# Patient Record
Sex: FEMALE | Race: BLACK OR AFRICAN AMERICAN
[De-identification: names, ages, dates, MRNs, and addresses within clinical notes are randomized per-mention and may not be internally consistent; named-entity substitution may affect disease eponyms.]

---

## 2017-07-25 ENCOUNTER — HOSPITAL ENCOUNTER (EMERGENCY)
Dept: HOSPITAL 74 - JERFT | Age: 62
Discharge: HOME | End: 2017-07-25
Payer: COMMERCIAL

## 2017-07-25 VITALS — TEMPERATURE: 98.1 F | DIASTOLIC BLOOD PRESSURE: 81 MMHG | HEART RATE: 77 BPM | SYSTOLIC BLOOD PRESSURE: 182 MMHG

## 2017-07-25 VITALS — BODY MASS INDEX: 32.8 KG/M2

## 2017-07-25 DIAGNOSIS — M25.512: ICD-10-CM
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
## 2017-07-25 DIAGNOSIS — M54.89: Primary | ICD-10-CM
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
## 2017-07-25 DIAGNOSIS — I10: ICD-10-CM

## 2017-07-25 DIAGNOSIS — E11.9: ICD-10-CM

## 2017-07-25 PROCEDURE — 3E0233Z INTRODUCTION OF ANTI-INFLAMMATORY INTO MUSCLE, PERCUTANEOUS APPROACH: ICD-10-PCS
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
## 2017-12-24 ENCOUNTER — HOSPITAL ENCOUNTER (EMERGENCY)
Dept: HOSPITAL 74 - JERFT | Age: 62
Discharge: HOME | End: 2017-12-24
Payer: COMMERCIAL

## 2017-12-24 VITALS — BODY MASS INDEX: 0.3 KG/M2

## 2017-12-24 VITALS — HEART RATE: 98 BPM | SYSTOLIC BLOOD PRESSURE: 151 MMHG | TEMPERATURE: 98.7 F | DIASTOLIC BLOOD PRESSURE: 81 MMHG

## 2017-12-24 DIAGNOSIS — I10: ICD-10-CM

## 2017-12-24 DIAGNOSIS — L02.611: ICD-10-CM

## 2017-12-24 DIAGNOSIS — S90.851A: Primary | ICD-10-CM

## 2017-12-24 DIAGNOSIS — E11.9: ICD-10-CM

## 2017-12-24 DIAGNOSIS — Z87.891: ICD-10-CM

## 2017-12-24 DIAGNOSIS — Z79.84: ICD-10-CM

## 2017-12-24 PROCEDURE — 0J960ZZ DRAINAGE OF CHEST SUBCUTANEOUS TISSUE AND FASCIA, OPEN APPROACH: ICD-10-PCS

## 2017-12-24 NOTE — PDOC
History of Present Illness





- General


Chief Complaint: Injury


Stated Complaint: SPLINTER IN FOOT


Time Seen by Provider: 12/24/17 22:20





- History of Present Illness


Initial Comments: 





12/24/17 23:04


CHIEF COMPLAINT: R foot pain





HISTORY OF PRESENT ILLNESS: 63 yo F with hx of NIDDM and HTN presents to fast 

track with pain to bottom of R foot.  Patient reports she got a splinter stuck 

in her foot 5 days ago and her  "removed it but didn't sterilize anything

", and now she is feeling increased pain and swelling to her R foot. Patient 

denies any fever, chills, nausea, vomiting, diarrhea. 





PAST MEDICAL HISTORY: Denies past medical history





FAMILY HISTORY: Denies





SOCIAL HISTORY:Denies tobacco, alcohol, illicit drug use. 





SURGICAL HISTORY: Denies





ALLERGIES: codeine





REVIEW OF SYSTEMS


General/Constitutional: Denies fever or chills. Denies weakness.





HEENT: Denies change in vision. Denies ear pain or discharge. Denies sore 

throat.





Cardiovascular: Denies chest pain or shortness of breath.





Respiratory: Denies cough, wheezing, or hemoptysis.





Gastrointestinal: Denies nausea, vomiting, diarrhea.





Genitourinary: Denies dysuria, frequency, or change in urination.





Musculoskeletal: Denies joint or muscle swelling or pain. Denies neck or back 

pain.





Skin: "I got a splinter in my foot 5 days ago and now it's swollen and painful.

" Denies rash or easy bruising.





Neurologic: Denies headache, vertigo, loss of consciousness, or loss of 

sensation.





PHYSICAL EXAM


General Appearance: Well-appearing, appropriately dressed.  No apparent 

distress.





HEENT: EOMI, PERRLA, normal ENT inspection, normal voice, TMs normal, pharynx 

normal.  No conjunctival pallor.  No photophobia, scleral icterus.





Neck: Supple.  Trachea midline. No tenderness, rigidity, carotid bruit, stridor

, lymphadenopathy, or thyromegaly. 





Respiratory/Chest: Lungs CTAB.  





Cardiovascular: RRR. S1, S2.  





Musculoskeletal/Extremities:  Normal inspection. FROM of all extremities, 

normal capillary refill.  Pelvis Stable.  No CVA tenderness. No tenderness to 

extremities, pedal edema, swelling, erythema or deformity.





Integumentary: 1 cm x 1 cm minimally fluctuant abscess to lateral aspect of R 

foot with small foreign body visible. Appropriate color, dry, warm.  No cyanosis

, erythema, jaundice or rash





Neurologic: CNs II-XII intact. Fully oriented, alert.  Appropriate mood/affect. 

Motor strength 5/5.  No appreciable EOM palsy, facial droop or sensory deficit.





Past History





- Past Medical History


Allergies/Adverse Reactions: 


 Allergies











Allergy/AdvReac Type Severity Reaction Status Date / Time


 


codeine [Codeine] Allergy   Verified 12/24/17 22:08











Home Medications: 


Ambulatory Orders





Glimepiride [Amaryl -] 1 mg PO DAILY 12/24/17 


Losartan Potassium 50 mg PO ASDIR 12/24/17 


Sitagliptin Phosphate [Januvia] 50 mg PO DAILY 12/24/17 


Sulfamethoxazole/Trimethoprim [Bactrim Ds -] 1 tab PO BID #14 tablet 12/24/17 








COPD: No


Diabetes: Yes


HTN: Yes





- Suicide/Smoking/Psychosocial Hx


Smoking Status: Yes


Smoking History: Former smoker


Have you smoked in the past 12 months: Yes


Number of Cigarettes Smoked Daily: 5


If you are a former smoker, when did you quit?: 8/2017


Information on smoking cessation initiated: No


Hx Alcohol Use: No


Drug/Substance Use Hx: No


Substance Use Type: None





*Physical Exam





- Vital Signs


 Last Vital Signs











Temp Pulse Resp BP Pulse Ox


 


 98.7 F   98 H  18   151/81   100 


 


 12/24/17 22:08  12/24/17 22:08  12/24/17 22:08  12/24/17 22:08  12/24/17 22:08














Medical Decision Making





- Medical Decision Making





12/24/17 23:07


63 yo F with hx of NIDDM and HTN presents to fast track with pain to bottom of 

R foot.





I&D of abscess performed (see proc note), wound culture sent





Splinter removed. 





PAtient is UTD with tetanus. 





Bactrim first dose given in FT. 





Advised patient to  rx tonight and continue meds beginning tomorrow 

morning.  Advised patient to take medication as prescribed and of signs and 

symptoms for return to ED.  Patient verbalized understanding and agrees to plan.





*DC/Admit/Observation/Transfer


Diagnosis at time of Disposition: 


 Abscess








- Discharge Dispostion


Disposition: HOME


Condition at time of disposition: Stable


Admit: No





- Prescriptions


Prescriptions: 


Sulfamethoxazole/Trimethoprim [Bactrim Ds -] 1 tab PO BID #14 tablet





- Referrals


Referrals: 


Bahman Swanson [Primary Care Provider] - 





- Patient Instructions


Printed Discharge Instructions:  DI for Incision and Drainage of a Skin Abscess


Additional Instructions: 


Please take antibiotics as prescribed.  Please keep the area of injury clean 

and dry tonight, and then use warm soaks daily 3-4 times a day until improved.  

If you develop any fever, chills, nausea, vomiting, or diarrhea, or the are of 

injury becomes increasingly swollen, hot, warm, or painful, please return to 

the ER immdiately. 





- Post Discharge Activity

## 2017-12-29 NOTE — PDOC
Patient Follow-up (Call Back)





- Post ED Follow - Up


Chief Complaint: Wound Infection


Condition at time of discharge: Improved


Disposition at time of original discharge: HOME


Reason for Call Back: Abnwl. Microbiology


Signs/Symptoms Improved: Yes





- Disposition


Rx Needed: Yes


Additional Instructions/Notes: 





Pt was seen for a wound on the foot.  + diabetic


Pt was placed on bactrium however the culture came back resistant


I have called and spoke with pt to change antibiotic to clinda and have her 

follow up with her PCP

## 2019-06-08 ENCOUNTER — HOSPITAL ENCOUNTER (INPATIENT)
Dept: HOSPITAL 74 - JER | Age: 64
LOS: 3 days | Discharge: HOME | DRG: 639 | End: 2019-06-11
Attending: INTERNAL MEDICINE | Admitting: INTERNAL MEDICINE
Payer: COMMERCIAL

## 2019-06-08 VITALS — BODY MASS INDEX: 35.2 KG/M2

## 2019-06-08 DIAGNOSIS — E11.65: Primary | ICD-10-CM

## 2019-06-08 DIAGNOSIS — E86.0: ICD-10-CM

## 2019-06-08 DIAGNOSIS — I10: ICD-10-CM

## 2019-06-08 LAB
ALBUMIN SERPL-MCNC: 3.8 G/DL (ref 3.4–5)
ALP SERPL-CCNC: 115 U/L (ref 45–117)
ALT SERPL-CCNC: 19 U/L (ref 13–61)
ANION GAP SERPL CALC-SCNC: 9 MMOL/L (ref 8–16)
APPEARANCE UR: CLEAR
AST SERPL-CCNC: 11 U/L (ref 15–37)
BASOPHILS # BLD: 0.7 % (ref 0–2)
BILIRUB SERPL-MCNC: 0.7 MG/DL (ref 0.2–1)
BILIRUB UR STRIP.AUTO-MCNC: NEGATIVE MG/DL
BUN SERPL-MCNC: 18.6 MG/DL (ref 7–18)
CALCIUM SERPL-MCNC: 9.8 MG/DL (ref 8.5–10.1)
CHLORIDE SERPL-SCNC: 95 MMOL/L (ref 98–107)
CHOLEST SERPL-MCNC: 162 MG/DL (ref 50–200)
CO2 SERPL-SCNC: 29 MMOL/L (ref 21–32)
COLOR UR: YELLOW
CREAT SERPL-MCNC: 1.3 MG/DL (ref 0.55–1.3)
DEPRECATED RDW RBC AUTO: 13.9 % (ref 11.6–15.6)
EOSINOPHIL # BLD: 1.2 % (ref 0–4.5)
GLUCOSE SERPL-MCNC: 627 MG/DL (ref 74–106)
HCT VFR BLD CALC: 41.7 % (ref 32.4–45.2)
HDLC SERPL-MCNC: 53 MG/DL (ref 40–60)
HGB BLD-MCNC: 13.2 GM/DL (ref 10.7–15.3)
KETONES UR QL STRIP: (no result)
LEUKOCYTE ESTERASE UR QL STRIP.AUTO: NEGATIVE
LYMPHOCYTES # BLD: 24.3 % (ref 8–40)
MCH RBC QN AUTO: 28.4 PG (ref 25.7–33.7)
MCHC RBC AUTO-ENTMCNC: 31.5 G/DL (ref 32–36)
MCV RBC: 90.1 FL (ref 80–96)
MONOCYTES # BLD AUTO: 8 % (ref 3.8–10.2)
NEUTROPHILS # BLD: 65.8 % (ref 42.8–82.8)
NITRITE UR QL STRIP: NEGATIVE
PH UR: 6.5 [PH] (ref 5–8)
PLATELET # BLD AUTO: 223 K/MM3 (ref 134–434)
PMV BLD: 10.1 FL (ref 7.5–11.1)
POTASSIUM SERPLBLD-SCNC: 4.8 MMOL/L (ref 3.5–5.1)
PROT SERPL-MCNC: 7.9 G/DL (ref 6.4–8.2)
PROT UR QL STRIP: (no result)
PROT UR QL STRIP: NEGATIVE
RBC # BLD AUTO: 4.63 M/MM3 (ref 3.6–5.2)
SODIUM SERPL-SCNC: 134 MMOL/L (ref 136–145)
SP GR UR: 1.04 (ref 1.01–1.03)
TRIGL SERPL-MCNC: 183 MG/DL (ref 0–150)
UROBILINOGEN UR STRIP-MCNC: 0.2 MG/DL (ref 0.2–1)
WBC # BLD AUTO: 7.3 K/MM3 (ref 4–10)

## 2019-06-08 PROCEDURE — G0378 HOSPITAL OBSERVATION PER HR: HCPCS

## 2019-06-08 RX ADMIN — INSULIN DETEMIR SCH UNITS: 100 INJECTION, SOLUTION SUBCUTANEOUS at 21:46

## 2019-06-08 RX ADMIN — INSULIN ASPART SCH: 100 INJECTION, SOLUTION INTRAVENOUS; SUBCUTANEOUS at 19:03

## 2019-06-08 NOTE — PDOC
History of Present Illness





- General


Chief Complaint: Blood Sugar Problem


Stated Complaint: SENT BY PCP/BP PROBLEM


Time Seen by Provider: 06/08/19 10:48


History Source: Patient





- History of Present Illness


Initial Comments: 





06/08/19 11:05


The patient is a 63 year old female with a PMH of NIDDM and HTN who presents to 

our ED c/o elevated blood sugar.  Patient is currently on oral anti-

hypoglycemics and measures her sugars at work on weekdays.  Last week her 

sugars were in the 100's and this week her sugar readings were consistently 

"high."  Endorses polyuria, polydipsia and blurry vision   States she was on 

Metformin daily, but was recently switched to Jentadueto.  Patient was at her 

PMD's office (Dr. Terrence Redding) this morning being evaluated for vaginal itching 

when her BS measured at 600 and she was instructed to come to the ED.





The patient denies abdominal pain, nausea/vomiting, chest pain, shortness of 

breath, numbness/tingling, headache.





Allergy: Codeine


Surgical: Tubal Ligation, B/L Rotator Cuff repair


PMD: Dr. Vahid Ocampo











Past History





- Past Medical History


Allergies/Adverse Reactions: 


 Allergies











Allergy/AdvReac Type Severity Reaction Status Date / Time


 


codeine [Codeine] Allergy   Verified 12/24/17 22:08











Home Medications: 


Ambulatory Orders





Glimepiride [Amaryl -] 1 mg PO DAILY 12/24/17 


Losartan Potassium 50 mg PO ASDIR 12/24/17 


Sitagliptin Phosphate [Januvia] 50 mg PO DAILY 12/24/17 


Clindamycin [Cleocin -] 150 mg PO Q8H #21 capsule 12/29/17 








COPD: No


Diabetes: Yes


HTN: Yes





- Suicide/Smoking/Psychosocial Hx


Smoking Status: Yes


Smoking History: Never smoked


Have you smoked in the past 12 months: No


Number of Cigarettes Smoked Daily: 5


If you are a former smoker, when did you quit?: 8/2017


Information on smoking cessation initiated: No


Hx Alcohol Use: Yes


Drug/Substance Use Hx: No


Substance Use Type: None





**Review of Systems





- Review of Systems


Constitutional: No: Chills, Fever


HEENTM: Yes: Blurred Vision


Respiratory: No: Cough, Shortness of Breath


Cardiac (ROS): No: Chest Pain, Lightheadedness, Palpitations


ABD/GI: No: Constipated, Diarrhea, Nausea, Vomiting, Abdominal cramping





*Physical Exam





- Vital Signs


 Last Vital Signs











Temp Pulse Resp BP Pulse Ox


 


 98.5 F   82   16   145/82   100 


 


 06/08/19 10:46  06/08/19 10:46  06/08/19 10:46  06/08/19 10:46  06/08/19 10:46














- Physical Exam


Comments: 





06/08/19 17:46


Triage VS reviewed


Well appearing/no acute distress


CV: S1, S2, RRR, no M/R/G


Respiratory: CLTA B/L, no wheeze/crackle


Abdomen: non-tender, (+) bowel sounds, no hernia/mass


Extremities: 2+ DP pulses B/L, no edema B/L


Neuro: A&O x3, CN II-XII intact








ED Treatment Course





- LABORATORY


CBC & Chemistry Diagram: 


 06/08/19 12:47





 06/08/19 12:47





Medical Decision Making





- Medical Decision Making





06/08/19 11:23


63 year old female with hyperglycemia, polyuria, polydipsia. 


Fingerstick reading of 600 this morning @ PMD's office.  


Insulin naive.  Will repeat BS, likely disposition is admission for initiation 

of insulin therapy.  


Considered DKA/HHONK, however no abdominal pain, N/V - will refrain from VBG, 

Serum Acetone at this time.  


PLAN: CBC/CMP, IV hydration, insulin pending K+   





06/08/19 14:13


Fingerstick 528 


K+ wnL


Will give 10 units regular insulin 





06/08/19 15:03


UA shows 3+ glucose, 1+ ketones


Repeat FS pending


Case d/w Dr. Mata (covering for patient's PMD, Dr. Vahid Ocampo) will admit for 

further evaluation including initiation of insulin therapy. 





06/08/19 15:09


Case d/w Dr. Vahid Ocampo (patient's PMD) agrees with admission


Patient counseled on plan of care, amenable to admission. 











*DC/Admit/Observation/Transfer


Diagnosis at time of Disposition: 


 Hyperglycemia due to type 1 diabetes mellitus








- Discharge Dispostion


Condition at time of disposition: Stable


Decision to Admit order: Yes





- Referrals





- Patient Instructions





- Post Discharge Activity

## 2019-06-08 NOTE — PDOC
Documentation entered by Patsy Plasencia SCRIBE, acting as scribe for Doug Villasenor MD.








Doug Villasenor MD:  This documentation has been prepared by the Luis Angel sigala Sammi, SCRIBE, under my direction and personally reviewed by me in its 

entirety.  I confirm that the documentation accurately reflects all work, 

treatment, procedures, and medical decision making performed by me.  





Attending Attestation





- Resident


Resident Name: Mira Hu





- ED Attending Attestation


I have performed the following: I have examined & evaluated the patient, The 

case was reviewed & discussed with the resident, I agree w/resident's findings 

& plan, Exceptions are as noted





- HPI


HPI: 





06/08/19 11:38


The patient is a 63 year old female, with a significant PMH of HTN, NIDDM, who 

presents to the emergency department for evaluation of high blood sugar. The 

patient presents from Dr. Esposito office, where she was being evaluated for 

polyuria and polydipsia. Upon obtaining a fingerstick, they had a reading in 

the 600s and the patient was sent to the ED for further evaluation. The patient 

states she regularly checks her blood sugar and consistently runs in the 100s. 

She reports noticing an increase in her blood sugar readings over the past 

week. The patient was taking Metformin, but Dr. Redding had recently changed her 

to Jentadueto. She also reports lightheadedness and a brief discomfort in her 

chest yesterday, which has subsided. 





Allergies: codeine


PCP: Vahid








- Physicial Exam


PE: 





06/08/19 11:22


Vitals: Triage vital signs reviewed


General Appearance: No acute distress, well nourished, well developed


Head: Atraumatic


Neck:  Supple; No nuchal rigidity


Chest Wall: Nontender


Cardiac: Regular rate and rhythm, no murmurs, no rubs, no gallops


Lungs: Clear to auscultation bilateral, good air movement bilaterally


Abdomen:  Soft, nondistended, normal bowel sounds, nontender to palpation


Extremities: Full range of motion to all extremities, no cyanosis, clubbing, or 

edema


Skin:  Warm and dry, no rashes or lesions, no rash, no petechiae


Psych: Normal mood, normal affect








- Medical Decision Making





06/08/19 13:54





63 years in with non-insulin-dependent diabetes presents to the ED with 

hyperglycemia as well as polyuria polydipsia





Sent to the emergency department for admission





Fingerstick greater than 600, no anion gap, no suspicion for DKA, we'll treat 

with insulin and admit the hospital for further management.








**Heart Score/ECG Review





- ECG Impressions


Comment:: 





06/08/19 13:55


EKG performed at 1133 demonstrates normal sinus rhythm no ST elevations or T-

wave inversions





Interpreted by me.

## 2019-06-08 NOTE — HP
Admitting History and Physical





- Primary Care Physician


PCP: Marly Mata





- Admission


Chief Complaint: Polyuria polydypsia


History of Present Illness: 


63 yrs old F with T2DM, poorly controlled, non compliant HTN, 1 wk ago present 

to clinic with dysuria recived Fluconazole, today present to clinic with 

ongoing polyuria and polydipsia,  lightheadedness FS recorded  > 600 mg 

referred tpo ED for hospitalization blood w/u shows  with elevated BUN/

Creat and dehydration admitted for sever hyperlycemia and dehydration management

, no c/o fever, chills, vomiting dirrhea, CVA pain c/o dysuria no c/o chest 

pian SOB or palpitation.








History Source: Patient





- Past Medical History


Cardiovascular: Yes: HTN


...Pregnant: No


Endocrine: Yes: Diabetes Mellitus





- Smoking History


Smoking history: Never smoked


Have you smoked in the past 12 months: No


Aproximately how many cigarettes per day: 5


If you are a former smoker, when did you quit?: 8/2017





- Alcohol/Substance Use


Hx Alcohol Use: Yes





Home Medications





- Allergies


Allergies/Adverse Reactions: 


 Allergies











Allergy/AdvReac Type Severity Reaction Status Date / Time


 


codeine [Codeine] Allergy   Verified 12/24/17 22:08














- Home Medications


Home Medications: 


Ambulatory Orders





Glimepiride [Amaryl -] 1 mg PO DAILY 12/24/17 


Losartan Potassium 50 mg PO ASDIR 12/24/17 


Sitagliptin Phosphate [Januvia] 50 mg PO DAILY 12/24/17 


Clindamycin [Cleocin -] 150 mg PO Q8H #21 capsule 12/29/17 











Family Disease History





- Family Disease History


Family Disease History: Diabetes: Mother





Review of Systems





- Review of Systems


Constitutional: reports: Lethargy, Malaise.  denies: Diaphoresis, Fever


Eyes: denies: Blind Spots, Blurred Vision


HENT: denies: Difficult Swallowing, Ear Discharge


Neck: denies: Decreased ROM, Lumps, Pain on Movement


Cardiovascular: denies: Chest Pain, Edema, Palpitations


Respiratory: denies: Cough, Exercise Intolerance, Hemoptysis


Gastrointestinal: denies: Abdominal Pain, Bloating, Constipation, Diarrhea


Genitourinary: reports: Burning, Dysuria, Frequency.  denies: Discharge, Flank 

Pain


Musculoskeletal: denies: Back Pain, Crepitus


Integumentary: denies: Bruising, Lesions


Neurological: reports: Dizziness.  denies: Change in LOC, Change in Speech, 

Confusion


Hematology/Lymphatic: denies: Easily Bruised


Psychiatric: denies: Altered Sleep Pattern


Pain Intensity: 0





Physical Examination


Vital Signs: 


 Vital Signs











Temperature  98.2 F   06/08/19 16:45


 


Pulse Rate  78   06/08/19 16:45


 


Respiratory Rate  20   06/08/19 16:45


 


Blood Pressure  154/87   06/08/19 16:45


 


O2 Sat by Pulse Oximetry (%)  100   06/08/19 10:46











Constitutional: Yes: Calm.  No: No Distress


Eyes: Yes: Conjunctiva Clear, EOM Intact.  No: Diplopia


HENT: No: Atraumatic, Normocephalic, Pharyngeal Erythema, Rhinnorhea


Neck: Yes: Supple, Trachea Midline.  No: Decreased ROM, Lymphadenopathy


Cardiovascular: Yes: Regular Rate and Rhythm, S1, S2, S3.  No: JVD, Murmur


Respiratory: Yes: Regular, CTA Bilaterally


Gastrointestinal: Yes: Normal Bowel Sounds, Soft


Musculoskeletal: No: Back Pain, Joint Stiffness, Joint Swelling


Edema: No


Peripheral Pulses: Left Doralis Pedis: 2+, Right Dorsalis Pedis: 2+


Neurological: Yes: Alert, Oriented.  No: Aphasia


...Motor Strength: WNL, LUE, LLE, RUE, RLE


Labs: 


 








WBC  7.3 K/mm3 (4.0-10.0)   06/08/19  12:47    


 


RBC  4.63 M/mm3 (3.60-5.2)   06/08/19  12:47    


 


Hgb  13.2 GM/dL (10.7-15.3)   06/08/19  12:47    


 


Hct  41.7 % (32.4-45.2)   06/08/19  12:47    


 


MCV  90.1 fl (80-96)   06/08/19  12:47    


 


MCHC  31.5 g/dl (32.0-36.0)  L  06/08/19  12:47    


 


RDW  13.9 % (11.6-15.6)   06/08/19  12:47    


 


Plt Count  223 K/MM3 (134-434)   06/08/19  12:47    


 


MPV  10.1 fl (7.5-11.1)   06/08/19  12:47    








 CMP











Sodium  134 mmol/L (136-145)  L  06/08/19  12:47    


 


Potassium  4.8 mmol/L (3.5-5.1)   06/08/19  12:47    


 


Chloride  95 mmol/L ()  L  06/08/19  12:47    


 


Carbon Dioxide  29 mmol/L (21-32)   06/08/19  12:47    


 


Anion Gap  9 MMOL/L (8-16)   06/08/19  12:47    


 


BUN  18.6 mg/dL (7-18)  H  06/08/19  12:47    


 


Creatinine  1.3 mg/dL (0.55-1.3)   06/08/19  12:47    


 


Calcium  9.8 mg/dL (8.5-10.1)   06/08/19  12:47    


 


Total Bilirubin  0.7 mg/dL (0.2-1)   06/08/19  12:47    


 


AST  11 U/L (15-37)  L  06/08/19  12:47    


 


ALT  19 U/L (13-61)   06/08/19  12:47    


 


Alkaline Phosphatase  115 U/L ()   06/08/19  12:47    


 


Total Protein  7.9 g/dl (6.4-8.2)   06/08/19  12:47    


 


Albumin  3.8 g/dl (3.4-5.0)   06/08/19  12:47    














Imaging





- Results


Chest X-ray: Report Reviewed (No infiltrates)


EKG: Report Reviewed (No acute St T chnages)





Problem List





- Problems


(1) Hyperglycemia due to type 2 diabetes mellitus


Assessment/Plan: 


present with Polyuria, Polydy[alicia with weakness FS reported > 600 , received IV 

hydraion, R insiulin will add Basal Insullin Levimir 20 units with correction 

dose insulin , diabetic Diet optimize as indicated


Code(s): E11.65 - TYPE 2 DIABETES MELLITUS WITH HYPERGLYCEMIA   





(2) HTN (hypertension)


Assessment/Plan: 


Resume home meds  F/U BP monitoring


Code(s): I10 - ESSENTIAL (PRIMARY) HYPERTENSION   





(3) Dehydration


Assessment/Plan: 


Due to uncontrolled DM F/U BMP ater IV Hydration


Code(s): E86.0 - DEHYDRATION

## 2019-06-09 LAB
ANION GAP SERPL CALC-SCNC: 4 MMOL/L (ref 8–16)
BASOPHILS # BLD: 0.5 % (ref 0–2)
BUN SERPL-MCNC: 9.1 MG/DL (ref 7–18)
CALCIUM SERPL-MCNC: 8.4 MG/DL (ref 8.5–10.1)
CHLORIDE SERPL-SCNC: 106 MMOL/L (ref 98–107)
CO2 SERPL-SCNC: 31 MMOL/L (ref 21–32)
CREAT SERPL-MCNC: 1 MG/DL (ref 0.55–1.3)
DEPRECATED RDW RBC AUTO: 13.6 % (ref 11.6–15.6)
EOSINOPHIL # BLD: 2.6 % (ref 0–4.5)
GLUCOSE SERPL-MCNC: 272 MG/DL (ref 74–106)
HCT VFR BLD CALC: 37.5 % (ref 32.4–45.2)
HGB BLD-MCNC: 12.3 GM/DL (ref 10.7–15.3)
LYMPHOCYTES # BLD: 32.3 % (ref 8–40)
MCH RBC QN AUTO: 29 PG (ref 25.7–33.7)
MCHC RBC AUTO-ENTMCNC: 32.9 G/DL (ref 32–36)
MCV RBC: 88.2 FL (ref 80–96)
MONOCYTES # BLD AUTO: 8.5 % (ref 3.8–10.2)
NEUTROPHILS # BLD: 56.1 % (ref 42.8–82.8)
PLATELET # BLD AUTO: 294 K/MM3 (ref 134–434)
PMV BLD: 9.9 FL (ref 7.5–11.1)
POTASSIUM SERPLBLD-SCNC: 4.4 MMOL/L (ref 3.5–5.1)
RBC # BLD AUTO: 4.25 M/MM3 (ref 3.6–5.2)
SODIUM SERPL-SCNC: 140 MMOL/L (ref 136–145)
WBC # BLD AUTO: 5.8 K/MM3 (ref 4–10)

## 2019-06-09 RX ADMIN — INSULIN ASPART SCH UNITS: 100 INJECTION, SOLUTION INTRAVENOUS; SUBCUTANEOUS at 17:27

## 2019-06-09 RX ADMIN — INSULIN ASPART SCH UNITS: 100 INJECTION, SOLUTION INTRAVENOUS; SUBCUTANEOUS at 21:52

## 2019-06-09 RX ADMIN — METFORMIN HYDROCHLORIDE SCH MG: 500 TABLET ORAL at 17:27

## 2019-06-09 RX ADMIN — LOSARTAN POTASSIUM SCH MG: 50 TABLET, FILM COATED ORAL at 09:29

## 2019-06-09 RX ADMIN — INSULIN ASPART SCH UNITS: 100 INJECTION, SOLUTION INTRAVENOUS; SUBCUTANEOUS at 11:57

## 2019-06-09 RX ADMIN — INSULIN ASPART SCH UNITS: 100 INJECTION, SOLUTION INTRAVENOUS; SUBCUTANEOUS at 06:39

## 2019-06-09 RX ADMIN — INSULIN DETEMIR SCH UNITS: 100 INJECTION, SOLUTION SUBCUTANEOUS at 21:52

## 2019-06-09 NOTE — CONSULT
Consult


Consult Specialty:: Endocrinology


Referred by:: Dr Mata


Reason for Consultation:: Hyperglycemia





- History of Present Illness


Chief Complaint: Hyperglycemia


History of Present Illness: 





This is a 63 year old female with h/o T2DM for about 5 years, never on Insulin 

and HTN who presented to  ED with c/o elevated blood sugar.  Patient was  on 

oral anti-hypoglycemics checked her sugars at work on weekdays which were 

usually low 100s in the morning.  Blood sugars started  readings  consistently 

"high" for the last week. Has developed  polyuria every hour,  polydipsia 

nocturia and blurry vision  for the same period. She recently started 

exercising and eating more vegetables for the last 2 weeks. Has been drinking 

orange juice also recently. Denies taking any new meds.    States she was on  

was recently switched to Jentadueto.  Patient was at her PMD's office yesterday 

morning being evaluated for vaginal itching when her BS measured at 600 and she 

was instructed to come to the ED.  Pt referred for management of hyperglycemia. 

The patient denies abdominal pain, nausea/vomiting, chest pain, shortness of 

breath, numbness/tingling, headache. Saw Ophthalmology in March. Pt denies any 

family h/o of DM including her mother. 








- History Source


History Provided By: Patient, Medical Record





- Past Medical History


Cardio/Vascular: Yes: HTN


...Pregnant: No


Endocrine: Yes: Diabetes Mellitus





- Alcohol/Substance Use


Hx Alcohol Use: Yes





- Smoking History


Smoking history: Never smoked


Have you smoked in the past 12 months: No


Aproximately how many cigarettes per day: 5


If you are a former smoker, when did you quit?: 8/2017





Home Medications





- Allergies


Allergies/Adverse Reactions: 


 Allergies











Allergy/AdvReac Type Severity Reaction Status Date / Time


 


codeine [Codeine] Allergy   Verified 12/24/17 22:08














- Home Medications


Home Medications: 


Ambulatory Orders





Glimepiride [Amaryl -] 1 mg PO DAILY 12/24/17 


Losartan Potassium 50 mg PO ASDIR 12/24/17 


Sitagliptin Phosphate [Januvia] 50 mg PO DAILY 12/24/17 


Clindamycin [Cleocin -] 150 mg PO Q8H #21 capsule 12/29/17 











Family Disease History





- Family Disease History


Other Family History: No family h/o DM





Review of Systems





- Review of Systems


Constitutional: reports: No Symptoms


Eyes: reports: Blurred Vision


HENT: reports: No Symptoms


Neck: reports: No Symptoms


Cardiovascular: reports: No Symptoms


Respiratory: reports: No Symptoms


Gastrointestinal: reports: No Symptoms


Genitourinary: reports: Other (Polyuria, polydipsia, nocturia Vaginal itching)


Musculoskeletal: reports: No Symptoms


Integumentary: reports: No Symptoms


Neurological: reports: No Symptoms


Endocrine: reports: No Symptoms





Physical Exam


Vital Signs: 


 Vital Signs











Temperature  98.8 F   06/09/19 05:51


 


Pulse Rate  77   06/09/19 05:51


 


Respiratory Rate  18   06/09/19 05:51


 


Blood Pressure  142/89   06/09/19 05:51


 


O2 Sat by Pulse Oximetry (%)  100   06/08/19 21:00











Constitutional: Yes: No Distress, Calm


Eyes: Yes: Conjunctiva Clear, EOM Intact


HENT: Yes: Atraumatic, Normocephalic


Neck: Yes: Supple, Trachea Midline


Cardiovascular: Yes: Regular Rate and Rhythm


Respiratory: Yes: Regular, CTA Bilaterally


Gastrointestinal: Yes: Normal Bowel Sounds, Soft


Musculoskeletal: Yes: WNL


Extremities: Yes: WNL


Edema: No


Neurological: Yes: Alert, Oriented


Labs: 


 CBC, BMP





 06/09/19 06:30 





 06/09/19 06:30 











Assessment/Plan





T2DM with hyperglycemia


HTN





Diet exercise discussed


Diabetes education 


Nutrition consult


Levemir 20 units daily at HS


Novolog SS coverage


Januvia 50mg QD


Restart Metformin 500mg BID


Teach pt to self inject Insulin and follow a sliding scale.


Will F/u

## 2019-06-09 NOTE — EKG
Test Reason : 

Blood Pressure : ***/*** mmHG

Vent. Rate : 092 BPM     Atrial Rate : 092 BPM

   P-R Int : 152 ms          QRS Dur : 072 ms

    QT Int : 376 ms       P-R-T Axes : 060 -24 032 degrees

   QTc Int : 464 ms

 

NORMAL SINUS RHYTHM

POSSIBLE LEFT ATRIAL ENLARGEMENT

LEFT VENTRICULAR HYPERTROPHY

CANNOT RULE OUT SEPTAL INFARCT , AGE UNDETERMINED

ABNORMAL ECG

WHEN COMPARED WITH ECG OF 07-FEB-2008 13:27,

MINIMAL CRITERIA FOR SEPTAL INFARCT ARE NOW PRESENT

Confirmed by NANCY HODGES MD ([id number]) on 6/9/2019 8:30:43 AM

 

Referred By:             Confirmed By:NANCY HODGES MD

## 2019-06-09 NOTE — PN
Progress Note, Physician


Chief Complaint: 





feels improved





- Current Medication List


Current Medications: 


Active Medications





Insulin Aspart (Novolog Vial Sliding Scale -)  1 vial SQ HS [facility]; Protocol


Insulin Aspart (Novolog Vial Sliding Scale -)  1 vial SQ [facility]; Protocol


Insulin Detemir (Levemir Vial)  20 units SQ HS [facility]


   Last Admin: 06/08/19 21:46 Dose:  20 units


Losartan Potassium (Cozaar -)  50 mg PO DAILY [facility]


   Last Admin: 06/09/19 09:29 Dose:  50 mg


Metformin HCl (Glucophage -)  500 mg PO BID@0700,1630 [facility]


Sitagliptin Phosphate (Januvia -)  50 mg PO DAILY@0700 [facility]


   Last Admin: 06/09/19 06:39 Dose:  50 mg











- Objective


Vital Signs: 


 Vital Signs











Temperature  98.8 F   06/09/19 05:51


 


Pulse Rate  77   06/09/19 05:51


 


Respiratory Rate  18   06/09/19 05:51


 


Blood Pressure  142/89   06/09/19 05:51


 


O2 Sat by Pulse Oximetry (%)  100   06/08/19 21:00











Constitutional: Yes: Well Nourished, No Distress, Calm


Eyes: Yes: Conjunctiva Clear, EOM Intact


HENT: Yes: Atraumatic, Normocephalic.  No: Drooling


Neck: Yes: Supple, Trachea Midline.  No: Lymphadenopathy


Cardiovascular: Yes: S1, S2.  No: JVD


Respiratory: Yes: Regular, CTA Bilaterally


Gastrointestinal: Yes: Soft.  No: Normal Bowel Sounds


Musculoskeletal: No: Back Pain, Joint Stiffness


Extremities: No: Calf Tenderness


Neurological: Yes: Alert


...Motor Strength: WNL, LUE, LLE, RUE, RLE


Labs: 


 CBC, BMP





 06/09/19 06:30 





 06/09/19 06:30 











Problem List





- Problems


(1) Hyperglycemia due to type 2 diabetes mellitus


Assessment/Plan: 


HbA!C 12 cont Levimir 20 with correction dose insulin , Metformin and Januvia 

Dc IV Hydration


Code(s): E11.65 - TYPE 2 DIABETES MELLITUS WITH HYPERGLYCEMIA   





(2) HTN (hypertension)


Assessment/Plan: 


Well controlled cont all home meds.


Code(s): I10 - ESSENTIAL (PRIMARY) HYPERTENSION   





(3) Dehydration


Assessment/Plan: 


Due to uncontrolled DM now resolved.


Code(s): E86.0 - DEHYDRATION

## 2019-06-10 LAB
ALBUMIN SERPL-MCNC: 3.2 G/DL (ref 3.4–5)
ALP SERPL-CCNC: 88 U/L (ref 45–117)
ALT SERPL-CCNC: 14 U/L (ref 13–61)
ANION GAP SERPL CALC-SCNC: 6 MMOL/L (ref 8–16)
AST SERPL-CCNC: 11 U/L (ref 15–37)
BASOPHILS # BLD: 1 % (ref 0–2)
BILIRUB SERPL-MCNC: 0.7 MG/DL (ref 0.2–1)
BUN SERPL-MCNC: 9.8 MG/DL (ref 7–18)
CALCIUM SERPL-MCNC: 8.3 MG/DL (ref 8.5–10.1)
CHLORIDE SERPL-SCNC: 103 MMOL/L (ref 98–107)
CO2 SERPL-SCNC: 29 MMOL/L (ref 21–32)
CREAT SERPL-MCNC: 0.9 MG/DL (ref 0.55–1.3)
DEPRECATED RDW RBC AUTO: 13.7 % (ref 11.6–15.6)
EOSINOPHIL # BLD: 3.1 % (ref 0–4.5)
GLUCOSE SERPL-MCNC: 270 MG/DL (ref 74–106)
HCT VFR BLD CALC: 37.6 % (ref 32.4–45.2)
HGB BLD-MCNC: 12.5 GM/DL (ref 10.7–15.3)
LYMPHOCYTES # BLD: 41.9 % (ref 8–40)
MCH RBC QN AUTO: 29.1 PG (ref 25.7–33.7)
MCHC RBC AUTO-ENTMCNC: 33.1 G/DL (ref 32–36)
MCV RBC: 87.7 FL (ref 80–96)
MONOCYTES # BLD AUTO: 9 % (ref 3.8–10.2)
NEUTROPHILS # BLD: 45 % (ref 42.8–82.8)
PLATELET # BLD AUTO: 233 K/MM3 (ref 134–434)
PMV BLD: 9.5 FL (ref 7.5–11.1)
POTASSIUM SERPLBLD-SCNC: 4.3 MMOL/L (ref 3.5–5.1)
PROT SERPL-MCNC: 6.5 G/DL (ref 6.4–8.2)
RBC # BLD AUTO: 4.29 M/MM3 (ref 3.6–5.2)
SODIUM SERPL-SCNC: 138 MMOL/L (ref 136–145)
WBC # BLD AUTO: 4.9 K/MM3 (ref 4–10)

## 2019-06-10 RX ADMIN — LOSARTAN POTASSIUM SCH MG: 50 TABLET, FILM COATED ORAL at 09:11

## 2019-06-10 RX ADMIN — INSULIN ASPART SCH UNITS: 100 INJECTION, SOLUTION INTRAVENOUS; SUBCUTANEOUS at 17:00

## 2019-06-10 RX ADMIN — INSULIN ASPART SCH UNITS: 100 INJECTION, SOLUTION INTRAVENOUS; SUBCUTANEOUS at 06:49

## 2019-06-10 RX ADMIN — METFORMIN HYDROCHLORIDE SCH MG: 500 TABLET ORAL at 17:05

## 2019-06-10 RX ADMIN — METFORMIN HYDROCHLORIDE SCH MG: 500 TABLET ORAL at 06:45

## 2019-06-10 RX ADMIN — INSULIN ASPART SCH UNITS: 100 INJECTION, SOLUTION INTRAVENOUS; SUBCUTANEOUS at 21:16

## 2019-06-10 RX ADMIN — INSULIN ASPART SCH UNITS: 100 INJECTION, SOLUTION INTRAVENOUS; SUBCUTANEOUS at 11:43

## 2019-06-10 NOTE — PN
Progress Note, Physician


Chief Complaint: 





Feels better





- Current Medication List


Current Medications: 


Active Medications





Insulin Aspart (Novolog Vial Sliding Scale -)  1 vial SQ HS [facility]; Protocol


   Last Admin: 06/09/19 21:52 Dose:  6 units


Insulin Aspart (Novolog Vial Sliding Scale -)  1 vial SQ TIDAC [facility]; Protocol


   Last Admin: 06/10/19 06:49 Dose:  8 units


Losartan Potassium (Cozaar -)  50 mg PO DAILY [facility]


   Last Admin: 06/10/19 09:11 Dose:  50 mg


Metformin HCl (Glucophage -)  500 mg PO BID@0700,1630 [facility]


   Last Admin: 06/10/19 06:45 Dose:  500 mg


Naproxen (Naprosyn -)  250 mg PO Q12H PRN


   PRN Reason: PAIN LEVEL 4 - 6


   Last Admin: 06/09/19 21:48 Dose:  250 mg


Sitagliptin Phosphate (Januvia -)  50 mg PO DAILY@0700 [facility]


   Last Admin: 06/10/19 06:45 Dose:  50 mg











- Objective


Vital Signs: 


 Vital Signs











Temperature  98 F   06/10/19 05:55


 


Pulse Rate  68   06/10/19 05:55


 


Respiratory Rate  17   06/10/19 05:55


 


Blood Pressure  149/70   06/10/19 05:55


 


O2 Sat by Pulse Oximetry (%)  99   06/09/19 21:00











Constitutional: Yes: No Distress


Eyes: Yes: WNL


HENT: Yes: WNL


Neck: Yes: WNL


Cardiovascular: Yes: WNL


Respiratory: Yes: WNL


Gastrointestinal: Yes: WNL


...Rectal Exam: Yes: WNL


Genitourinary: Yes: WNL


Breast(s): Yes: WNL


Integumentary: Yes: WNL


Neurological: Yes: Alert


Labs: 


 CBC, BMP





 06/10/19 06:37 





 06/10/19 06:37 











Problem List





- Problems


(1) Hyperglycemia due to type 2 diabetes mellitus


Code(s): E11.65 - TYPE 2 DIABETES MELLITUS WITH HYPERGLYCEMIA   





(2) HTN (hypertension)


Code(s): I10 - ESSENTIAL (PRIMARY) HYPERTENSION   





(3) Dehydration


Code(s): E86.0 - DEHYDRATION   





Assessment/Plan





Continue same trt

## 2019-06-10 NOTE — PN
Progress Note (short form)





- Note


Progress Note: 





Denies any complaints


Blood sugar Improving





 Vital Signs











 Period  Temp  Pulse  Resp  BP Sys/[place]  Pulse Ox


 


 Last 24 Hr  98 F-98.8 F  68-95  17-18  136-189/68-89  96-99











PE:AOx3


Neck supple


HEENT:PERRL EOME


Lungs: CTA


CVS: S1S2


ABd: Benign


EXT: No edema


Neuro: No focal deficit





 CMP











Sodium  138 mmol/L (136-145)   06/10/19  06:37    


 


Potassium  4.3 mmol/L (3.5-5.1)   06/10/19  06:37    


 


Chloride  103 mmol/L ()   06/10/19  06:37    


 


Carbon Dioxide  29 mmol/L (21-32)   06/10/19  06:37    


 


Anion Gap  6 MMOL/L (8-16)  L  06/10/19  06:37    


 


BUN  9.8 mg/dL (7-18)   06/10/19  06:37    


 


Creatinine  0.9 mg/dL (0.55-1.3)   06/10/19  06:37    


 


Est GFR (CKD-EPI)AfAm  79.98   06/10/19  06:37    


 


Est GFR (CKD-EPI)NonAf  69.01   06/10/19  06:37    


 


POC Glucometer  201 UNITS ()   06/10/19  11:41    


 


Random Glucose  270 mg/dL ()  H  06/10/19  06:37    


 


Hemoglobin A1c %  12.4 % (4.2-6.3)  H  06/09/19  06:30    


 


Calcium  8.3 mg/dL (8.5-10.1)  L  06/10/19  06:37    


 


Total Bilirubin  0.7 mg/dL (0.2-1)   06/10/19  06:37    


 


AST  11 U/L (15-37)  L  06/10/19  06:37    


 


ALT  14 U/L (13-61)   06/10/19  06:37    


 


Alkaline Phosphatase  88 U/L ()   06/10/19  06:37    


 


Creatine Kinase  349 U/L ()  H  06/08/19  12:47    


 


Creatine Kinase Index  0.4 % (0.0-5.0)   06/08/19  12:47    


 


CK-MB (CK-2)  1.7 ng/mL (0.5-3.6)   06/08/19  12:47    


 


Troponin I  < 0.02 ng/ml (0.00-0.05)   06/08/19  12:47    


 


Total Protein  6.5 g/dl (6.4-8.2)   06/10/19  06:37    


 


Albumin  3.2 g/dl (3.4-5.0)  L  06/10/19  06:37    


 


Triglycerides  183 mg/dL (0-150)  H  06/08/19  12:47    


 


Cholesterol  162 mg/dL ()   06/08/19  12:47    


 


Total LDL Cholesterol  91 mg/dL (5-100)   06/08/19  12:47    


 


HDL Cholesterol  53 mg/dL (40-60)   06/08/19  12:47    








 Current Medications











Generic Name Dose Route Start Last Admin





  Trade Name Freq  PRN Reason Stop Dose Admin


 


Insulin Aspart  1 vial  06/09/19 22:00  06/09/19 21:52





  Novolog Vial Sliding Scale -  SQ   6 units





  [facility]   Administration





     





     





  Protocol   





     


 


Insulin Aspart  1 vial  06/09/19 11:00  06/10/19 11:43





  Novolog Vial Sliding Scale -  SQ   8 units





  TIDAC [facility]   Administration





     





     





  Protocol   





     


 


Insulin Detemir  25 units  06/10/19 22:00  





  Levemir Vial  SQ   





  [facility]   





     





     





     





     


 


Losartan Potassium  50 mg  06/09/19 10:00  06/10/19 09:11





  Cozaar -  PO   50 mg





  DAILY [facility]   Administration





     





     





     





     


 


Metformin HCl  500 mg  06/09/19 16:30  06/10/19 06:45





  Glucophage -  PO   500 mg





  BID@0700,1630 [facility]   Administration





     





     





     





     


 


Naproxen  250 mg  06/09/19 14:15  06/09/19 21:48





  Naprosyn -  PO   250 mg





  Q12H PRN   Administration





  PAIN LEVEL 4 - 6   





     





     





     


 


Sitagliptin Phosphate  50 mg  06/09/19 07:00  06/10/19 06:45





  Januvia -  PO   50 mg





  DAILY@0700 [facility]   Administration





     





     





     





     











AP:








T2DM with hyperglycemia


HTN





Diet exercise discussed


Diabetes education 


Nutrition consult


Increase Levemir 24 units daily at HS


Novolog SS coverage


Januvia 50mg QD


Metformin 500mg BID


Pt learning to  self inject Insulin and follow a sliding scale.


Will F/u

## 2019-06-11 VITALS — DIASTOLIC BLOOD PRESSURE: 83 MMHG | HEART RATE: 81 BPM | TEMPERATURE: 98.8 F | SYSTOLIC BLOOD PRESSURE: 160 MMHG

## 2019-06-11 RX ADMIN — LOSARTAN POTASSIUM SCH MG: 50 TABLET, FILM COATED ORAL at 09:16

## 2019-06-11 RX ADMIN — METFORMIN HYDROCHLORIDE SCH MG: 500 TABLET ORAL at 06:05

## 2019-06-11 RX ADMIN — INSULIN ASPART SCH UNITS: 100 INJECTION, SOLUTION INTRAVENOUS; SUBCUTANEOUS at 06:08

## 2019-06-11 NOTE — DS
Physical Examination


Vital Signs: 


 Vital Signs











Temperature  97.9 F   06/11/19 06:00


 


Pulse Rate  75   06/11/19 06:00


 


Respiratory Rate  18   06/11/19 06:00


 


Blood Pressure  159/84   06/11/19 06:00


 


O2 Sat by Pulse Oximetry (%)  99   06/10/19 20:15











Findings/Remarks: 





Feels better,this AM she was C/O nausea,received Zofran,felt better


Urine culture grew beta sterp [id number],pansensitive


DC home on PO antibiotics


Constitutional: Yes: No Distress


Eyes: Yes: WNL


HENT: Yes: WNL


Neck: Yes: WNL


Cardiovascular: Yes: WNL


Respiratory: Yes: WNL


Gastrointestinal: Yes: WNL


...Rectal Exam: Yes: WNL


Renal/: Yes: WNL


Musculoskeletal: Yes: Muscle Weakness


Edema: No


Peripheral Pulses WNL: Yes


Integumentary: Yes: WNL


Neurological: Yes: Alert


Psychiatric: Yes: Alert


Labs: 


 CBC, BMP





 06/10/19 06:37 





 06/10/19 06:37 











Discharge Summary


Reason For Visit: HYPERGLYCEMIA


Current Active Problems





Dehydration (Acute)


HTN (hypertension) (Acute)


Hyperglycemia due to type 1 diabetes mellitus (Acute)


Hyperglycemia due to type 2 diabetes mellitus (Acute)








Condition: Stable





- Instructions





- Home Medications


Comprehensive Discharge Medication List: 


Ambulatory Orders





Glimepiride [Amaryl -] 1 mg PO DAILY 12/24/17 


Losartan Potassium 50 mg PO ASDIR 12/24/17 


Sitagliptin Phosphate [Januvia] 50 mg PO DAILY 12/24/17 


Clindamycin [Cleocin -] 150 mg PO Q8H #21 capsule 12/29/17

## 2023-10-07 ENCOUNTER — HOSPITAL ENCOUNTER (EMERGENCY)
Dept: HOSPITAL 74 - JER | Age: 68
Discharge: HOME | End: 2023-10-07
Payer: COMMERCIAL

## 2023-10-07 VITALS — TEMPERATURE: 98.6 F | SYSTOLIC BLOOD PRESSURE: 152 MMHG | HEART RATE: 81 BPM | DIASTOLIC BLOOD PRESSURE: 65 MMHG

## 2023-10-07 VITALS — RESPIRATION RATE: 20 BRPM

## 2023-10-07 VITALS — BODY MASS INDEX: 34.2 KG/M2

## 2023-10-07 DIAGNOSIS — Z48.812: ICD-10-CM

## 2023-10-07 DIAGNOSIS — R22.42: Primary | ICD-10-CM

## 2023-10-07 DIAGNOSIS — R68.83: ICD-10-CM

## 2023-10-07 LAB
ALBUMIN SERPL-MCNC: 2.8 G/DL (ref 3.4–5)
ALP SERPL-CCNC: 132 U/L (ref 45–117)
ALT SERPL-CCNC: 24 U/L (ref 13–61)
ANION GAP SERPL CALC-SCNC: 5 MMOL/L (ref 8–16)
APTT BLD: 32.1 SECONDS (ref 25.2–36.5)
AST SERPL-CCNC: 16 U/L (ref 15–37)
BASOPHILS # BLD: 0.4 % (ref 0–2)
BILIRUB SERPL-MCNC: 0.3 MG/DL (ref 0.2–1)
BUN SERPL-MCNC: 13.2 MG/DL (ref 7–18)
CALCIUM SERPL-MCNC: 8.3 MG/DL (ref 8.5–10.1)
CHLORIDE SERPL-SCNC: 108 MMOL/L (ref 98–107)
CO2 SERPL-SCNC: 27 MMOL/L (ref 21–32)
CREAT SERPL-MCNC: 0.8 MG/DL (ref 0.55–1.3)
DEPRECATED RDW RBC AUTO: 16.6 % (ref 11.6–15.6)
EOSINOPHIL # BLD: 4.1 % (ref 0–4.5)
GLUCOSE SERPL-MCNC: 178 MG/DL (ref 74–106)
HCT VFR BLD CALC: 36.8 % (ref 32.4–45.2)
HGB BLD-MCNC: 11.7 GM/DL (ref 10.7–15.3)
INR BLD: 1.04 (ref 0.83–1.09)
LYMPHOCYTES # BLD: 23.9 % (ref 8–40)
MCH RBC QN AUTO: 26.8 PG (ref 25.7–33.7)
MCHC RBC AUTO-ENTMCNC: 31.8 G/DL (ref 32–36)
MCV RBC: 84.2 FL (ref 80–96)
MONOCYTES # BLD AUTO: 8.8 % (ref 3.8–10.2)
NEUTROPHILS # BLD: 62.8 % (ref 42.8–82.8)
PLATELET # BLD AUTO: 263 10^3/UL (ref 134–434)
PMV BLD: 8.7 FL (ref 7.5–11.1)
POTASSIUM SERPLBLD-SCNC: 3.7 MMOL/L (ref 3.5–5.1)
PROT SERPL-MCNC: 6.7 G/DL (ref 6.4–8.2)
PT PNL PPP: 12.1 SEC (ref 9.7–13)
RBC # BLD AUTO: 4.37 M/MM3 (ref 3.6–5.2)
SODIUM SERPL-SCNC: 139 MMOL/L (ref 136–145)
WBC # BLD AUTO: 9.1 K/MM3 (ref 4–10)

## 2023-10-07 PROCEDURE — 3E033NZ INTRODUCTION OF ANALGESICS, HYPNOTICS, SEDATIVES INTO PERIPHERAL VEIN, PERCUTANEOUS APPROACH: ICD-10-PCS
